# Patient Record
Sex: FEMALE | Race: WHITE | NOT HISPANIC OR LATINO
[De-identification: names, ages, dates, MRNs, and addresses within clinical notes are randomized per-mention and may not be internally consistent; named-entity substitution may affect disease eponyms.]

---

## 2021-10-13 ENCOUNTER — APPOINTMENT (OUTPATIENT)
Dept: VASCULAR SURGERY | Facility: CLINIC | Age: 56
End: 2021-10-13
Payer: MEDICAID

## 2021-10-13 PROCEDURE — 99203 OFFICE O/P NEW LOW 30 MIN: CPT

## 2021-10-20 PROBLEM — Z00.00 ENCOUNTER FOR PREVENTIVE HEALTH EXAMINATION: Status: ACTIVE | Noted: 2021-10-20

## 2021-12-14 ENCOUNTER — APPOINTMENT (OUTPATIENT)
Dept: VASCULAR SURGERY | Facility: CLINIC | Age: 56
End: 2021-12-14
Payer: MEDICAID

## 2021-12-14 ENCOUNTER — NON-APPOINTMENT (OUTPATIENT)
Age: 56
End: 2021-12-14

## 2021-12-14 VITALS — DIASTOLIC BLOOD PRESSURE: 69 MMHG | HEART RATE: 73 BPM | SYSTOLIC BLOOD PRESSURE: 100 MMHG

## 2021-12-14 VITALS — SYSTOLIC BLOOD PRESSURE: 106 MMHG | DIASTOLIC BLOOD PRESSURE: 74 MMHG | HEART RATE: 73 BPM

## 2021-12-14 PROCEDURE — 36475 ENDOVENOUS RF 1ST VEIN: CPT | Mod: RT

## 2021-12-14 NOTE — PROCEDURE
[FreeTextEntry1] : R WALI RFA [FreeTextEntry2] : varicose veins. Venous reflux  [FreeTextEntry3] : The patient was seen in the waiting room where the consent was obtained. She was  taken to the procedure room where a timeout was called to verify her identity and the laterality of the procedure. The patient's right leg was  interrogated under ultrasound and an appropriate place for cannulation was identified. The leg  was  prepped  with chloroprep and draped in the standard fashion. Under ultrasound guidance  1% plain lidocaine was injected  in the proximal calf. Access was then obtained with a 7 FR sheath in the standard fashion using a micropuncture technique. Inner dilator was removed. The sheath was irrigated. RFA catheter was placed to the SFJ and withdrawn 3.0 cm from the junction.  Tumescence was administered around the saphenous vein under ultrasound guidance. The ablation was  performed Using a total of 7 cycles. The catheter and sheath were withdrawn. Complete hemostasis was achieved with manual compression. Steri strips were applied to catheter insertion site. Leg was wrapped in Kerlix and an ace wrap. Patient tolerated the procedure well and verbalized understanding of post-procedure instructions. The patient was discharged in stable condition.\par

## 2021-12-21 ENCOUNTER — APPOINTMENT (OUTPATIENT)
Dept: VASCULAR SURGERY | Facility: CLINIC | Age: 56
End: 2021-12-21
Payer: MEDICAID

## 2021-12-21 PROCEDURE — 93971 EXTREMITY STUDY: CPT

## 2022-01-18 ENCOUNTER — APPOINTMENT (OUTPATIENT)
Dept: VASCULAR SURGERY | Facility: CLINIC | Age: 57
End: 2022-01-18
Payer: MEDICAID

## 2022-02-01 ENCOUNTER — APPOINTMENT (OUTPATIENT)
Dept: VASCULAR SURGERY | Facility: CLINIC | Age: 57
End: 2022-02-01
Payer: MEDICAID

## 2022-02-01 VITALS — SYSTOLIC BLOOD PRESSURE: 114 MMHG | HEART RATE: 69 BPM | DIASTOLIC BLOOD PRESSURE: 74 MMHG

## 2022-02-01 VITALS — DIASTOLIC BLOOD PRESSURE: 77 MMHG | HEART RATE: 77 BPM | SYSTOLIC BLOOD PRESSURE: 118 MMHG

## 2022-02-01 PROCEDURE — 36475 ENDOVENOUS RF 1ST VEIN: CPT | Mod: LT

## 2022-02-01 NOTE — PROCEDURE
[FreeTextEntry1] : Left greater saphenous vein ablation [FreeTextEntry2] : Symptomatic varicose veins.  Venous insufficiency and reflux. [FreeTextEntry3] : The patient was seen in the waiting room where the consent was obtained. She was  taken to the procedure room where a timeout was called to verify her identity and the laterality of the procedure. The patient's left leg was  interrogated under ultrasound and an appropriate place for cannulation was identified. The leg  was  prepped  with chloroprep and draped in the standard fashion. Under ultrasound guidance  1% plain lidocaine was injected  in the proximal calf. Access was then obtained with a 7 FR sheath in the standard fashion using a micropuncture technique. Inner dilator was removed. The sheath was irrigated. RFA catheter was placed to the SFJ and withdrawn 3.0 cm from the junction.  Tumescence was administered around the saphenous vein under ultrasound guidance. The ablation was  performed Using a total of 6 cycles. The catheter and sheath were withdrawn. Complete hemostasis was achieved with manual compression. Steri strips were applied to catheter insertion site. Leg was wrapped in Kerlix and an ace wrap. Patient tolerated the procedure well and verbalized understanding of post-procedure instructions. The patient was discharged in stable condition.\par

## 2022-02-08 ENCOUNTER — APPOINTMENT (OUTPATIENT)
Dept: VASCULAR SURGERY | Facility: CLINIC | Age: 57
End: 2022-02-08
Payer: MEDICAID

## 2022-02-08 PROCEDURE — 99213 OFFICE O/P EST LOW 20 MIN: CPT

## 2022-02-08 PROCEDURE — 93971 EXTREMITY STUDY: CPT

## 2022-02-08 RX ORDER — DIAZEPAM 10 MG/1
10 TABLET ORAL
Qty: 2 | Refills: 0 | Status: DISCONTINUED | COMMUNITY
Start: 2022-01-31 | End: 2022-02-08

## 2022-02-08 RX ORDER — DIAZEPAM 10 MG/1
10 TABLET ORAL
Qty: 2 | Refills: 0 | Status: DISCONTINUED | COMMUNITY
Start: 2021-12-13 | End: 2022-02-08

## 2022-02-18 NOTE — HISTORY OF PRESENT ILLNESS
[FreeTextEntry1] : 57 yo F with h/o RLE varicose veisn now s/p RFA presented or a follow up. \par She is doing well. \par leg edema improved

## 2022-02-18 NOTE — PHYSICAL EXAM
[JVD] : no jugular venous distention  [Calm] : calm [de-identified] : NAD. Awake and alert [de-identified] : NCAT  [FreeTextEntry1] : 2+ bilateral carotid, radial, femoral, popliteal, DP and PT pulses.\par Bilateral lower extremity edema. Improved s/p RFA \par Varicose veins greater than 3 mm in diameter present bilaterally.\par  [de-identified] : Soft, NT, ND. No guarding. No palpable masses. No HSM [de-identified] : Normal muscle bulk and tone. FROM in all extremities.\par  [de-identified] : AAOx3, CN II-XII intact. Strength 5/5 in all 4 extremities. Sensation intact throughout.

## 2022-02-18 NOTE — ASSESSMENT
[FreeTextEntry1] : 56-year-old female status post RFA.  She is doing well postoperatively.  She will continue leg compression and elevation.\par We will proceed with an RFA of contralateral leg.

## 2022-03-04 ENCOUNTER — APPOINTMENT (OUTPATIENT)
Dept: VASCULAR SURGERY | Facility: CLINIC | Age: 57
End: 2022-03-04
Payer: MEDICAID

## 2022-03-04 VITALS — HEART RATE: 76 BPM | SYSTOLIC BLOOD PRESSURE: 120 MMHG | DIASTOLIC BLOOD PRESSURE: 79 MMHG

## 2022-03-04 VITALS — SYSTOLIC BLOOD PRESSURE: 101 MMHG | HEART RATE: 69 BPM | DIASTOLIC BLOOD PRESSURE: 69 MMHG

## 2022-03-04 DIAGNOSIS — I87.2 VENOUS INSUFFICIENCY (CHRONIC) (PERIPHERAL): ICD-10-CM

## 2022-03-04 DIAGNOSIS — I83.899 VARICOSE VEINS OF UNSPECIFIED LOWER EXTREMITY WITH OTHER COMPLICATIONS: ICD-10-CM

## 2022-03-04 PROCEDURE — 36475 ENDOVENOUS RF 1ST VEIN: CPT | Mod: RT

## 2022-03-04 NOTE — PROCEDURE
[FreeTextEntry1] : R SSV RFA  [FreeTextEntry2] : symptomatic venous reflux and varicose veins  [FreeTextEntry3] : The patient was seen in the waiting room where the consent was obtained. She was  taken to the procedure room where a timeout was called to verify her identity and the laterality of the procedure. The patient's right leg was  interrogated under ultrasound and an appropriate place for cannulation was identified. The leg  was  prepped  with chloroprep and draped in the standard fashion. Under ultrasound guidance  1% plain lidocaine was injected  in the distal  calf. Access was then obtained with a 7 FR sheath in the standard fashion using a micropuncture technique. Inner dilator was removed. The sheath was irrigated. RFA catheter was placed to the SPJ and withdrawn 3.0 cm from the junction.  Tumescence was administered around the saphenous vein under ultrasound guidance. The ablation was  performed Using a total of 4 cycles. The catheter and sheath were withdrawn. Complete hemostasis was achieved with manual compression. Steri strips were applied to catheter insertion site. Leg was wrapped in Kerlix and an ace wrap. Patient tolerated the procedure well and verbalized understanding of post-procedure instructions. The patient was discharged in stable condition.\par

## 2022-03-08 ENCOUNTER — APPOINTMENT (OUTPATIENT)
Dept: VASCULAR SURGERY | Facility: CLINIC | Age: 57
End: 2022-03-08

## 2022-03-29 ENCOUNTER — APPOINTMENT (OUTPATIENT)
Dept: VASCULAR SURGERY | Facility: CLINIC | Age: 57
End: 2022-03-29

## 2022-03-29 ENCOUNTER — APPOINTMENT (OUTPATIENT)
Dept: VASCULAR SURGERY | Facility: CLINIC | Age: 57
End: 2022-03-29
Payer: MEDICAID

## 2022-03-29 PROCEDURE — 93971 EXTREMITY STUDY: CPT

## 2024-05-24 ENCOUNTER — TRANSCRIPTION ENCOUNTER (OUTPATIENT)
Age: 59
End: 2024-05-24

## 2025-01-16 ENCOUNTER — NON-APPOINTMENT (OUTPATIENT)
Age: 60
End: 2025-01-16

## 2025-01-20 ENCOUNTER — NON-APPOINTMENT (OUTPATIENT)
Age: 60
End: 2025-01-20

## 2025-08-08 ENCOUNTER — NON-APPOINTMENT (OUTPATIENT)
Age: 60
End: 2025-08-08